# Patient Record
Sex: FEMALE | Race: WHITE | Employment: FULL TIME | URBAN - NONMETROPOLITAN AREA
[De-identification: names, ages, dates, MRNs, and addresses within clinical notes are randomized per-mention and may not be internally consistent; named-entity substitution may affect disease eponyms.]

---

## 2019-12-25 ENCOUNTER — HOSPITAL ENCOUNTER (EMERGENCY)
Facility: HOSPITAL | Age: 49
Discharge: HOME | End: 2019-12-25
Attending: EMERGENCY MEDICINE | Admitting: EMERGENCY MEDICINE
Payer: COMMERCIAL

## 2019-12-25 VITALS
RESPIRATION RATE: 16 BRPM | OXYGEN SATURATION: 96 % | DIASTOLIC BLOOD PRESSURE: 113 MMHG | TEMPERATURE: 96.4 F | SYSTOLIC BLOOD PRESSURE: 154 MMHG | HEART RATE: 97 BPM

## 2019-12-25 DIAGNOSIS — F10.920 ALCOHOLIC INTOXICATION WITHOUT COMPLICATION (H): ICD-10-CM

## 2019-12-25 LAB
ALBUMIN SERPL-MCNC: 4 G/DL (ref 3.4–5)
ALP SERPL-CCNC: 114 U/L (ref 40–150)
ALT SERPL W P-5'-P-CCNC: 24 U/L (ref 0–50)
AMPHETAMINES UR QL: NOT DETECTED NG/ML
ANION GAP SERPL CALCULATED.3IONS-SCNC: 10 MMOL/L (ref 3–14)
APAP SERPL-MCNC: <2 MG/L (ref 10–20)
AST SERPL W P-5'-P-CCNC: 33 U/L (ref 0–45)
BARBITURATES UR QL SCN: NOT DETECTED NG/ML
BASOPHILS # BLD AUTO: 0.1 10E9/L (ref 0–0.2)
BASOPHILS NFR BLD AUTO: 0.6 %
BENZODIAZ UR QL SCN: NOT DETECTED NG/ML
BILIRUB SERPL-MCNC: 0.2 MG/DL (ref 0.2–1.3)
BUN SERPL-MCNC: 6 MG/DL (ref 7–30)
BUPRENORPHINE UR QL: NOT DETECTED NG/ML
CALCIUM SERPL-MCNC: 8.4 MG/DL (ref 8.5–10.1)
CANNABINOIDS UR QL: NOT DETECTED NG/ML
CHLORIDE SERPL-SCNC: 109 MMOL/L (ref 94–109)
CO2 SERPL-SCNC: 22 MMOL/L (ref 20–32)
COCAINE UR QL SCN: NOT DETECTED NG/ML
CREAT SERPL-MCNC: 0.57 MG/DL (ref 0.52–1.04)
D-METHAMPHET UR QL: NOT DETECTED NG/ML
DIFFERENTIAL METHOD BLD: ABNORMAL
EOSINOPHIL # BLD AUTO: 0.4 10E9/L (ref 0–0.7)
EOSINOPHIL NFR BLD AUTO: 2.8 %
ERYTHROCYTE [DISTWIDTH] IN BLOOD BY AUTOMATED COUNT: 13.2 % (ref 10–15)
ETHANOL SERPL-MCNC: 0.12 G/DL
GFR SERPL CREATININE-BSD FRML MDRD: >90 ML/MIN/{1.73_M2}
GLUCOSE SERPL-MCNC: 105 MG/DL (ref 70–99)
HCG UR QL: NEGATIVE
HCT VFR BLD AUTO: 46.2 % (ref 35–47)
HGB BLD-MCNC: 14.7 G/DL (ref 11.7–15.7)
IMM GRANULOCYTES # BLD: 0.1 10E9/L (ref 0–0.4)
IMM GRANULOCYTES NFR BLD: 0.5 %
LYMPHOCYTES # BLD AUTO: 2.1 10E9/L (ref 0.8–5.3)
LYMPHOCYTES NFR BLD AUTO: 16.1 %
MCH RBC QN AUTO: 26.3 PG (ref 26.5–33)
MCHC RBC AUTO-ENTMCNC: 31.8 G/DL (ref 31.5–36.5)
MCV RBC AUTO: 83 FL (ref 78–100)
METHADONE UR QL SCN: NOT DETECTED NG/ML
MONOCYTES # BLD AUTO: 0.8 10E9/L (ref 0–1.3)
MONOCYTES NFR BLD AUTO: 6.4 %
NEUTROPHILS # BLD AUTO: 9.6 10E9/L (ref 1.6–8.3)
NEUTROPHILS NFR BLD AUTO: 73.6 %
NRBC # BLD AUTO: 0 10*3/UL
NRBC BLD AUTO-RTO: 0 /100
OPIATES UR QL SCN: NOT DETECTED NG/ML
OXYCODONE UR QL SCN: NOT DETECTED NG/ML
PCP UR QL SCN: NOT DETECTED NG/ML
PLATELET # BLD AUTO: 423 10E9/L (ref 150–450)
POTASSIUM SERPL-SCNC: 3.5 MMOL/L (ref 3.4–5.3)
PROPOXYPH UR QL: NOT DETECTED NG/ML
PROT SERPL-MCNC: 8 G/DL (ref 6.8–8.8)
RBC # BLD AUTO: 5.58 10E12/L (ref 3.8–5.2)
SALICYLATES SERPL-MCNC: 3 MG/DL
SALICYLATES SERPL-MCNC: 4 MG/DL
SODIUM SERPL-SCNC: 141 MMOL/L (ref 133–144)
TRICYCLICS UR QL SCN: NOT DETECTED NG/ML
WBC # BLD AUTO: 13 10E9/L (ref 4–11)

## 2019-12-25 PROCEDURE — 80329 ANALGESICS NON-OPIOID 1 OR 2: CPT | Mod: 59 | Performed by: EMERGENCY MEDICINE

## 2019-12-25 PROCEDURE — 99283 EMERGENCY DEPT VISIT LOW MDM: CPT | Mod: Z6 | Performed by: EMERGENCY MEDICINE

## 2019-12-25 PROCEDURE — 85025 COMPLETE CBC W/AUTO DIFF WBC: CPT | Performed by: EMERGENCY MEDICINE

## 2019-12-25 PROCEDURE — 36415 COLL VENOUS BLD VENIPUNCTURE: CPT | Performed by: EMERGENCY MEDICINE

## 2019-12-25 PROCEDURE — 80320 DRUG SCREEN QUANTALCOHOLS: CPT | Performed by: EMERGENCY MEDICINE

## 2019-12-25 PROCEDURE — 81025 URINE PREGNANCY TEST: CPT | Performed by: EMERGENCY MEDICINE

## 2019-12-25 PROCEDURE — 80306 DRUG TEST PRSMV INSTRMNT: CPT | Performed by: EMERGENCY MEDICINE

## 2019-12-25 PROCEDURE — 99285 EMERGENCY DEPT VISIT HI MDM: CPT

## 2019-12-25 PROCEDURE — 80053 COMPREHEN METABOLIC PANEL: CPT | Performed by: EMERGENCY MEDICINE

## 2019-12-25 PROCEDURE — 80329 ANALGESICS NON-OPIOID 1 OR 2: CPT | Performed by: EMERGENCY MEDICINE

## 2019-12-25 RX ORDER — AMLODIPINE BESYLATE 2.5 MG/1
2.5 TABLET ORAL DAILY
COMMUNITY

## 2019-12-25 RX ORDER — LOSARTAN POTASSIUM AND HYDROCHLOROTHIAZIDE 12.5; 5 MG/1; MG/1
1 TABLET ORAL DAILY
COMMUNITY

## 2019-12-25 ASSESSMENT — ENCOUNTER SYMPTOMS
CARDIOVASCULAR NEGATIVE: 1
FEVER: 0
GASTROINTESTINAL NEGATIVE: 1
HEMATOLOGIC/LYMPHATIC NEGATIVE: 1
CHILLS: 0
SHORTNESS OF BREATH: 0
RESPIRATORY NEGATIVE: 1
PHOTOPHOBIA: 0
PSYCHIATRIC NEGATIVE: 1
CONSTITUTIONAL NEGATIVE: 1
MYALGIAS: 0
ENDOCRINE NEGATIVE: 1
ALLERGIC/IMMUNOLOGIC NEGATIVE: 1
EYES NEGATIVE: 1
NECK STIFFNESS: 0
MUSCULOSKELETAL NEGATIVE: 1
NEUROLOGICAL NEGATIVE: 1
NECK PAIN: 0
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
## 2019-12-25 NOTE — ED NOTES
2nd ASA level being drawn to see trend. Pt is medically clear pending ASA level and will not have deputy wait for results and will call if ASA level is elevated and will be asked to bring back to an ER. Officer understands discharge directions.

## 2019-12-25 NOTE — ED AVS SNAPSHOT
[facility]  [address] [address]  Phone:  [phone]                                    Felicia Aviles   MRN: [id number]    Department:  [facility]   Date of Visit:  12/25/2019           After Visit Summary Signature Page    I have received my discharge instructions, and my questions have been answered. I have discussed any challenges I see with this plan with the nurse or doctor.    ..........................................................................................................................................  Patient/Patient Representative Signature      ..........................................................................................................................................  Patient Representative Print Name and Relationship to Patient    ..................................................               ................................................  Date                                   Time    ..........................................................................................................................................  Reviewed by Signature/Title    ...................................................              ..............................................  Date                                               Time          22EPIC Rev 08/18

## 2021-01-25 ENCOUNTER — APPOINTMENT (OUTPATIENT)
Dept: PHYSICAL THERAPY | Facility: HOSPITAL | Age: 51
End: 2021-01-25
Attending: INTERNAL MEDICINE
Payer: COMMERCIAL

## 2021-01-25 ENCOUNTER — HOSPITAL ENCOUNTER (OUTPATIENT)
Facility: HOSPITAL | Age: 51
Setting detail: OBSERVATION
Discharge: HOME | End: 2021-01-25
Attending: FAMILY MEDICINE | Admitting: INTERNAL MEDICINE
Payer: COMMERCIAL

## 2021-01-25 ENCOUNTER — APPOINTMENT (OUTPATIENT)
Dept: CT IMAGING | Facility: HOSPITAL | Age: 51
End: 2021-01-25
Attending: FAMILY MEDICINE
Payer: COMMERCIAL

## 2021-01-25 ENCOUNTER — APPOINTMENT (OUTPATIENT)
Dept: MRI IMAGING | Facility: HOSPITAL | Age: 51
End: 2021-01-25
Attending: INTERNAL MEDICINE
Payer: COMMERCIAL

## 2021-01-25 ENCOUNTER — APPOINTMENT (OUTPATIENT)
Dept: OCCUPATIONAL THERAPY | Facility: HOSPITAL | Age: 51
End: 2021-01-25
Attending: INTERNAL MEDICINE
Payer: COMMERCIAL

## 2021-01-25 VITALS
HEIGHT: 67 IN | OXYGEN SATURATION: 98 % | DIASTOLIC BLOOD PRESSURE: 88 MMHG | TEMPERATURE: 98.4 F | RESPIRATION RATE: 16 BRPM | WEIGHT: 209.44 LBS | BODY MASS INDEX: 32.87 KG/M2 | HEART RATE: 83 BPM | SYSTOLIC BLOOD PRESSURE: 150 MMHG

## 2021-01-25 DIAGNOSIS — R53.1 RIGHT SIDED WEAKNESS: Primary | ICD-10-CM

## 2021-01-25 LAB
ALBUMIN SERPL-MCNC: 3.4 G/DL (ref 3.4–5)
ALBUMIN UR-MCNC: NEGATIVE MG/DL
ALP SERPL-CCNC: 112 U/L (ref 40–150)
ALT SERPL W P-5'-P-CCNC: 49 U/L (ref 0–50)
AMPHETAMINES UR QL: NOT DETECTED NG/ML
ANION GAP SERPL CALCULATED.3IONS-SCNC: 5 MMOL/L (ref 3–14)
ANION GAP SERPL CALCULATED.3IONS-SCNC: 8 MMOL/L (ref 3–14)
APPEARANCE UR: CLEAR
APTT PPP: 30 SEC (ref 22–37)
AST SERPL W P-5'-P-CCNC: 71 U/L (ref 0–45)
BACTERIA #/AREA URNS HPF: ABNORMAL /HPF
BARBITURATES UR QL SCN: NOT DETECTED NG/ML
BASOPHILS # BLD AUTO: 0.1 10E9/L (ref 0–0.2)
BASOPHILS NFR BLD AUTO: 1.3 %
BENZODIAZ UR QL SCN: NOT DETECTED NG/ML
BILIRUB SERPL-MCNC: 0.2 MG/DL (ref 0.2–1.3)
BILIRUB UR QL STRIP: NEGATIVE
BUN SERPL-MCNC: 4 MG/DL (ref 7–30)
BUN SERPL-MCNC: 5 MG/DL (ref 7–30)
BUPRENORPHINE UR QL: NOT DETECTED NG/ML
CALCIUM SERPL-MCNC: 8.1 MG/DL (ref 8.5–10.1)
CALCIUM SERPL-MCNC: 8.7 MG/DL (ref 8.5–10.1)
CANNABINOIDS UR QL: NOT DETECTED NG/ML
CHLORIDE SERPL-SCNC: 107 MMOL/L (ref 94–109)
CHLORIDE SERPL-SCNC: 111 MMOL/L (ref 94–109)
CO2 SERPL-SCNC: 24 MMOL/L (ref 20–32)
CO2 SERPL-SCNC: 30 MMOL/L (ref 20–32)
COCAINE UR QL SCN: NOT DETECTED NG/ML
COLOR UR AUTO: ABNORMAL
CREAT SERPL-MCNC: 0.5 MG/DL (ref 0.52–1.04)
CREAT SERPL-MCNC: 0.56 MG/DL (ref 0.52–1.04)
D-METHAMPHET UR QL: NOT DETECTED NG/ML
DIFFERENTIAL METHOD BLD: ABNORMAL
EOSINOPHIL # BLD AUTO: 0.2 10E9/L (ref 0–0.7)
EOSINOPHIL NFR BLD AUTO: 3.3 %
ERYTHROCYTE [DISTWIDTH] IN BLOOD BY AUTOMATED COUNT: 15 % (ref 10–15)
ERYTHROCYTE [DISTWIDTH] IN BLOOD BY AUTOMATED COUNT: 15.1 % (ref 10–15)
ETHANOL SERPL-MCNC: 0.08 G/DL
GFR SERPL CREATININE-BSD FRML MDRD: >90 ML/MIN/{1.73_M2}
GFR SERPL CREATININE-BSD FRML MDRD: >90 ML/MIN/{1.73_M2}
GLUCOSE SERPL-MCNC: 101 MG/DL (ref 70–99)
GLUCOSE SERPL-MCNC: 113 MG/DL (ref 70–99)
GLUCOSE UR STRIP-MCNC: NEGATIVE MG/DL
HCT VFR BLD AUTO: 39.9 % (ref 35–47)
HCT VFR BLD AUTO: 42.4 % (ref 35–47)
HGB BLD-MCNC: 12.2 G/DL (ref 11.7–15.7)
HGB BLD-MCNC: 13.1 G/DL (ref 11.7–15.7)
HGB UR QL STRIP: ABNORMAL
HYALINE CASTS #/AREA URNS LPF: 2 /LPF
IMM GRANULOCYTES # BLD: 0 10E9/L (ref 0–0.4)
IMM GRANULOCYTES NFR BLD: 0.3 %
INR PPP: 1.07 (ref 0.86–1.14)
KETONES UR STRIP-MCNC: NEGATIVE MG/DL
LABORATORY COMMENT REPORT: ABNORMAL
LEUKOCYTE ESTERASE UR QL STRIP: NEGATIVE
LYMPHOCYTES # BLD AUTO: 1.8 10E9/L (ref 0.8–5.3)
LYMPHOCYTES NFR BLD AUTO: 28.4 %
MCH RBC QN AUTO: 24.3 PG (ref 26.5–33)
MCH RBC QN AUTO: 24.6 PG (ref 26.5–33)
MCHC RBC AUTO-ENTMCNC: 30.6 G/DL (ref 31.5–36.5)
MCHC RBC AUTO-ENTMCNC: 30.9 G/DL (ref 31.5–36.5)
MCV RBC AUTO: 79 FL (ref 78–100)
MCV RBC AUTO: 80 FL (ref 78–100)
METHADONE UR QL SCN: NOT DETECTED NG/ML
MONOCYTES # BLD AUTO: 0.5 10E9/L (ref 0–1.3)
MONOCYTES NFR BLD AUTO: 7.6 %
MUCOUS THREADS #/AREA URNS LPF: PRESENT /LPF
NEUTROPHILS # BLD AUTO: 3.7 10E9/L (ref 1.6–8.3)
NEUTROPHILS NFR BLD AUTO: 59.1 %
NITRATE UR QL: NEGATIVE
NRBC # BLD AUTO: 0 10*3/UL
NRBC BLD AUTO-RTO: 0 /100
OPIATES UR QL SCN: NOT DETECTED NG/ML
OXYCODONE UR QL SCN: NOT DETECTED NG/ML
PCP UR QL SCN: NOT DETECTED NG/ML
PH UR STRIP: 6.5 PH (ref 4.7–8)
PLATELET # BLD AUTO: 372 10E9/L (ref 150–450)
PLATELET # BLD AUTO: 443 10E9/L (ref 150–450)
POTASSIUM SERPL-SCNC: 3.8 MMOL/L (ref 3.4–5.3)
POTASSIUM SERPL-SCNC: 4 MMOL/L (ref 3.4–5.3)
PROPOXYPH UR QL: NOT DETECTED NG/ML
PROT SERPL-MCNC: 7.1 G/DL (ref 6.8–8.8)
RBC # BLD AUTO: 5.03 10E12/L (ref 3.8–5.2)
RBC # BLD AUTO: 5.33 10E12/L (ref 3.8–5.2)
RBC #/AREA URNS AUTO: 1 /HPF (ref 0–2)
SARS-COV-2 RNA RESP QL NAA+PROBE: POSITIVE
SODIUM SERPL-SCNC: 142 MMOL/L (ref 133–144)
SODIUM SERPL-SCNC: 143 MMOL/L (ref 133–144)
SOURCE: ABNORMAL
SP GR UR STRIP: 1.03 (ref 1–1.03)
SPECIMEN SOURCE: ABNORMAL
SQUAMOUS #/AREA URNS AUTO: 0 /HPF (ref 0–1)
TRICYCLICS UR QL SCN: NOT DETECTED NG/ML
TROPONIN I SERPL-MCNC: <0.015 UG/L (ref 0–0.04)
UROBILINOGEN UR STRIP-MCNC: NORMAL MG/DL (ref 0–2)
WBC # BLD AUTO: 6.3 10E9/L (ref 4–11)
WBC # BLD AUTO: 9.7 10E9/L (ref 4–11)
WBC #/AREA URNS AUTO: <1 /HPF (ref 0–5)

## 2021-01-25 PROCEDURE — 81001 URINALYSIS AUTO W/SCOPE: CPT | Performed by: INTERNAL MEDICINE

## 2021-01-25 PROCEDURE — 84484 ASSAY OF TROPONIN QUANT: CPT | Performed by: FAMILY MEDICINE

## 2021-01-25 PROCEDURE — 85730 THROMBOPLASTIN TIME PARTIAL: CPT | Performed by: FAMILY MEDICINE

## 2021-01-25 PROCEDURE — 85027 COMPLETE CBC AUTOMATED: CPT | Mod: 59 | Performed by: INTERNAL MEDICINE

## 2021-01-25 PROCEDURE — 85610 PROTHROMBIN TIME: CPT | Performed by: FAMILY MEDICINE

## 2021-01-25 PROCEDURE — 250N000013 HC RX MED GY IP 250 OP 250 PS 637

## 2021-01-25 PROCEDURE — 250N000011 HC RX IP 250 OP 636: Performed by: FAMILY MEDICINE

## 2021-01-25 PROCEDURE — 70551 MRI BRAIN STEM W/O DYE: CPT

## 2021-01-25 PROCEDURE — 82077 ASSAY SPEC XCP UR&BREATH IA: CPT | Performed by: INTERNAL MEDICINE

## 2021-01-25 PROCEDURE — U0003 INFECTIOUS AGENT DETECTION BY NUCLEIC ACID (DNA OR RNA); SEVERE ACUTE RESPIRATORY SYNDROME CORONAVIRUS 2 (SARS-COV-2) (CORONAVIRUS DISEASE [COVID-19]), AMPLIFIED PROBE TECHNIQUE, MAKING USE OF HIGH THROUGHPUT TECHNOLOGIES AS DESCRIBED BY CMS-2020-01-R: HCPCS | Performed by: FAMILY MEDICINE

## 2021-01-25 PROCEDURE — 97165 OT EVAL LOW COMPLEX 30 MIN: CPT | Mod: GO

## 2021-01-25 PROCEDURE — U0005 INFEC AGEN DETEC AMPLI PROBE: HCPCS | Performed by: FAMILY MEDICINE

## 2021-01-25 PROCEDURE — G0378 HOSPITAL OBSERVATION PER HR: HCPCS

## 2021-01-25 PROCEDURE — 97530 THERAPEUTIC ACTIVITIES: CPT | Mod: GP | Performed by: PHYSICAL THERAPIST

## 2021-01-25 PROCEDURE — 70450 CT HEAD/BRAIN W/O DYE: CPT

## 2021-01-25 PROCEDURE — 36415 COLL VENOUS BLD VENIPUNCTURE: CPT | Performed by: FAMILY MEDICINE

## 2021-01-25 PROCEDURE — 80053 COMPREHEN METABOLIC PANEL: CPT | Performed by: FAMILY MEDICINE

## 2021-01-25 PROCEDURE — 97161 PT EVAL LOW COMPLEX 20 MIN: CPT | Mod: GP | Performed by: PHYSICAL THERAPIST

## 2021-01-25 PROCEDURE — 97530 THERAPEUTIC ACTIVITIES: CPT | Mod: GO

## 2021-01-25 PROCEDURE — C9803 HOPD COVID-19 SPEC COLLECT: HCPCS

## 2021-01-25 PROCEDURE — 93010 ELECTROCARDIOGRAM REPORT: CPT | Performed by: INTERNAL MEDICINE

## 2021-01-25 PROCEDURE — 93005 ELECTROCARDIOGRAM TRACING: CPT

## 2021-01-25 PROCEDURE — 250N000013 HC RX MED GY IP 250 OP 250 PS 637: Performed by: INTERNAL MEDICINE

## 2021-01-25 PROCEDURE — 85025 COMPLETE CBC W/AUTO DIFF WBC: CPT | Performed by: FAMILY MEDICINE

## 2021-01-25 PROCEDURE — 99285 EMERGENCY DEPT VISIT HI MDM: CPT | Performed by: FAMILY MEDICINE

## 2021-01-25 PROCEDURE — 70496 CT ANGIOGRAPHY HEAD: CPT

## 2021-01-25 PROCEDURE — 80306 DRUG TEST PRSMV INSTRMNT: CPT | Performed by: INTERNAL MEDICINE

## 2021-01-25 PROCEDURE — 99235 HOSP IP/OBS SAME DATE MOD 70: CPT | Performed by: INTERNAL MEDICINE

## 2021-01-25 PROCEDURE — 250N000013 HC RX MED GY IP 250 OP 250 PS 637: Performed by: FAMILY MEDICINE

## 2021-01-25 PROCEDURE — 36415 COLL VENOUS BLD VENIPUNCTURE: CPT | Performed by: INTERNAL MEDICINE

## 2021-01-25 PROCEDURE — 250N000013 HC RX MED GY IP 250 OP 250 PS 637: Performed by: NURSE PRACTITIONER

## 2021-01-25 PROCEDURE — 80048 BASIC METABOLIC PNL TOTAL CA: CPT | Performed by: INTERNAL MEDICINE

## 2021-01-25 PROCEDURE — 99291 CRITICAL CARE FIRST HOUR: CPT | Mod: 25

## 2021-01-25 RX ORDER — NITROGLYCERIN 0.4 MG/1
0.4 TABLET SUBLINGUAL EVERY 5 MIN PRN
COMMUNITY

## 2021-01-25 RX ORDER — PEDIATRIC MULTIVITAMIN NO.17
1 TABLET,CHEWABLE ORAL DAILY
COMMUNITY

## 2021-01-25 RX ORDER — IBUPROFEN 400 MG/1
400 TABLET, FILM COATED ORAL EVERY 6 HOURS PRN
Status: DISCONTINUED | OUTPATIENT
Start: 2021-01-25 | End: 2021-01-25 | Stop reason: HOSPADM

## 2021-01-25 RX ORDER — HYDRALAZINE HYDROCHLORIDE 20 MG/ML
5 INJECTION INTRAMUSCULAR; INTRAVENOUS EVERY 6 HOURS PRN
Status: DISCONTINUED | OUTPATIENT
Start: 2021-01-25 | End: 2021-01-25 | Stop reason: HOSPADM

## 2021-01-25 RX ORDER — ESTRADIOL 0.5 MG/1
0.5 TABLET ORAL DAILY
Status: ON HOLD | COMMUNITY
End: 2021-01-25

## 2021-01-25 RX ORDER — LANOLIN ALCOHOL/MO/W.PET/CERES
3 CREAM (GRAM) TOPICAL
Status: DISCONTINUED | OUTPATIENT
Start: 2021-01-25 | End: 2021-01-25 | Stop reason: HOSPADM

## 2021-01-25 RX ORDER — HYDROCHLOROTHIAZIDE 12.5 MG/1
CAPSULE ORAL
Status: COMPLETED
Start: 2021-01-25 | End: 2021-01-25

## 2021-01-25 RX ORDER — AMLODIPINE BESYLATE 5 MG/1
5 TABLET ORAL ONCE
Status: COMPLETED | OUTPATIENT
Start: 2021-01-25 | End: 2021-01-25

## 2021-01-25 RX ORDER — ACETAMINOPHEN 325 MG/1
650 TABLET ORAL EVERY 4 HOURS PRN
Status: DISCONTINUED | OUTPATIENT
Start: 2021-01-25 | End: 2021-01-25 | Stop reason: HOSPADM

## 2021-01-25 RX ORDER — METHYLPHENIDATE HYDROCHLORIDE 20 MG/1
CAPSULE, EXTENDED RELEASE ORAL
Status: ON HOLD | COMMUNITY
End: 2021-01-25

## 2021-01-25 RX ORDER — NICOTINE 21 MG/24HR
1 PATCH, TRANSDERMAL 24 HOURS TRANSDERMAL DAILY PRN
Status: DISCONTINUED | OUTPATIENT
Start: 2021-01-25 | End: 2021-01-25 | Stop reason: HOSPADM

## 2021-01-25 RX ORDER — ONDANSETRON 2 MG/ML
4 INJECTION INTRAMUSCULAR; INTRAVENOUS EVERY 6 HOURS PRN
Status: DISCONTINUED | OUTPATIENT
Start: 2021-01-25 | End: 2021-01-25 | Stop reason: HOSPADM

## 2021-01-25 RX ORDER — ACETAMINOPHEN 325 MG/1
650 TABLET ORAL ONCE
Status: COMPLETED | OUTPATIENT
Start: 2021-01-25 | End: 2021-01-25

## 2021-01-25 RX ORDER — IOPAMIDOL 755 MG/ML
50 INJECTION, SOLUTION INTRAVASCULAR ONCE
Status: COMPLETED | OUTPATIENT
Start: 2021-01-25 | End: 2021-01-25

## 2021-01-25 RX ORDER — ONDANSETRON 4 MG/1
4 TABLET, ORALLY DISINTEGRATING ORAL EVERY 6 HOURS PRN
Status: DISCONTINUED | OUTPATIENT
Start: 2021-01-25 | End: 2021-01-25 | Stop reason: HOSPADM

## 2021-01-25 RX ORDER — SUMATRIPTAN 25 MG/1
25 TABLET, FILM COATED ORAL ONCE
Status: COMPLETED | OUTPATIENT
Start: 2021-01-25 | End: 2021-01-25

## 2021-01-25 RX ORDER — ISOSORBIDE DINITRATE 10 MG/1
10 TABLET ORAL 2 TIMES DAILY
COMMUNITY

## 2021-01-25 RX ORDER — AMLODIPINE BESYLATE 2.5 MG/1
2.5 TABLET ORAL AT BEDTIME
Status: DISCONTINUED | OUTPATIENT
Start: 2021-01-25 | End: 2021-01-25 | Stop reason: HOSPADM

## 2021-01-25 RX ORDER — NITROGLYCERIN 0.4 MG/1
0.4 TABLET SUBLINGUAL EVERY 5 MIN PRN
Status: DISCONTINUED | OUTPATIENT
Start: 2021-01-25 | End: 2021-01-25 | Stop reason: HOSPADM

## 2021-01-25 RX ORDER — CITALOPRAM HYDROBROMIDE 20 MG/1
20 TABLET ORAL AT BEDTIME
Status: DISCONTINUED | OUTPATIENT
Start: 2021-01-25 | End: 2021-01-25 | Stop reason: HOSPADM

## 2021-01-25 RX ORDER — AMLODIPINE BESYLATE 5 MG/1
5 TABLET ORAL AT BEDTIME
Status: DISCONTINUED | OUTPATIENT
Start: 2021-01-25 | End: 2021-01-25

## 2021-01-25 RX ORDER — AMOXICILLIN 250 MG
1 CAPSULE ORAL 2 TIMES DAILY PRN
Status: DISCONTINUED | OUTPATIENT
Start: 2021-01-25 | End: 2021-01-25 | Stop reason: HOSPADM

## 2021-01-25 RX ORDER — LOSARTAN POTASSIUM 50 MG/1
TABLET ORAL
Status: COMPLETED
Start: 2021-01-25 | End: 2021-01-25

## 2021-01-25 RX ORDER — AMOXICILLIN 250 MG
2 CAPSULE ORAL 2 TIMES DAILY PRN
Status: DISCONTINUED | OUTPATIENT
Start: 2021-01-25 | End: 2021-01-25 | Stop reason: HOSPADM

## 2021-01-25 RX ORDER — ASPIRIN 81 MG/1
81 TABLET ORAL DAILY
COMMUNITY

## 2021-01-25 RX ADMIN — IOPAMIDOL 50 ML: 755 INJECTION, SOLUTION INTRAVENOUS at 00:10

## 2021-01-25 RX ADMIN — ACETAMINOPHEN 650 MG: 325 TABLET, FILM COATED ORAL at 02:10

## 2021-01-25 RX ADMIN — SUMATRIPTAN SUCCINATE 25 MG: 25 TABLET ORAL at 11:29

## 2021-01-25 RX ADMIN — LOSARTAN POTASSIUM 50 MG: 50 TABLET, FILM COATED ORAL at 02:11

## 2021-01-25 RX ADMIN — HYDROCHLOROTHIAZIDE 12.5 MG: 12.5 CAPSULE ORAL at 02:11

## 2021-01-25 RX ADMIN — NICOTINE 1 PATCH: 14 PATCH, EXTENDED RELEASE TRANSDERMAL at 11:34

## 2021-01-25 RX ADMIN — IBUPROFEN 400 MG: 400 TABLET ORAL at 03:10

## 2021-01-25 RX ADMIN — NICOTINE 1 PATCH: 14 PATCH, EXTENDED RELEASE TRANSDERMAL at 03:12

## 2021-01-25 RX ADMIN — AMLODIPINE BESYLATE 5 MG: 5 TABLET ORAL at 02:11

## 2021-01-25 ASSESSMENT — MIFFLIN-ST. JEOR: SCORE: 1592.63
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
## 2021-01-25 NOTE — ED NOTES
In CT with patient. Patient is able to speak some words, but has trouble getting the words out.  She is able to tell this RN that she is having pain on left side of her head. Patient has good  on left and no  on right.  Patient able to move left lower extremity, but unable to move right lower extremity. Provider updated on this.
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
## 2021-01-25 NOTE — PROVIDER NOTIFICATION
DATE:  1/25/2021   TIME OF RECEIPT FROM LAB:  0850  LAB TEST:  COVID  LAB VALUE:  +  RESULTS GIVEN WITH READ-BACK TO (PROVIDER):  Ange Guaman NP  TIME LAB VALUE REPORTED TO PROVIDER:   0905      Patient placed in special precautions for COVID.  Precautions discontinued upon verification of Recovered COVID (November 2020 @ [facility])
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
## 2021-01-25 NOTE — PLAN OF CARE
Pt A&Ox3, speech is somewhat slow at times. She reports aching and sharp heachache like pain on the left side. Ibuprofen given with no issues swallowing. Pupils are equal and reactive. Face is symmetrical but reports tingling and fullness on the right side of face. Able to stick out tongue but unable to move it back and forth. Right extremities are weak compared to left. Pt stood and was able to take a few steps to transfer to bed. Urine collected and sent. Small bruise on chest and arms. Rash to hands bilaterally. IV saline lock. Pt has meds that was placed in med room drawer. Alarms on, call light within reach and pt calls appropriately.     Face to face report given with opportunity to observe patient.    Report given to NICK Young RN   1/25/2021  7:41 AM
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
## 2021-01-25 NOTE — PLAN OF CARE
[facility] Inpatient Admission Note:    Patient admitted to 3232/3232-1 at approximately 0245 via cart accompanied by other:er staff from emergency room . Report received from ulises in SBAR format at 0217 via telephone. Patient ambulated to bed via self.. Patient is alert and oriented X 3, reports pain; rates at 6 on 0-10 scale.  Patient oriented to room, unit, hourly rounding, and plan of care. Explained admission packet and patient handbook with patient bill of rights brochure. Will continue to monitor and document as needed.     Inpatient Nursing criteria listed below was met:    Health care directives status obtained and documented: Yes    Care Everywhere authorization obtained No    MRSA swab completed for patient 65 years and older: N/A    Patient identifies a surrogate decision maker: Yes If yes, who:spouse simon Contact Information:see facesheet     If initial lactic acid >2.0, repeat lactic acid drawn within one hour of arrival to unit: NA. If no, state reason: NA    Vaccination assessment and education completed: Yes   Vaccinations received prior to admission: Pneumovax no  Influenza(seasonal)  NO   Vaccination(s) ordered: patient declines    Clergy visit ordered if patient requests: N/A    Skin issues/needs documented: N/A    Isolation Patient: no Education given, correct sign in place and documentation row added to PCS:  No    Fall Prevention No: Care plan updated, education given and documented, sticker and magnet in place: N/A    Care Plan initiated: Yes    Education Documented (including assessment): Yes    Patient has discharge needs : No If yes, please explain:NA

## 2021-01-25 NOTE — PLAN OF CARE
Prior to Admission Medication Reconciliation:     Medications added:   [] None  [x] As listed below: asa- discontinued in December by [facility] but pt reports taking PTA    Medications deleted:   [] None  [x] As listed below:    Omeprazole- marked for review/removal by MD. Still on medlist from [facility] but pt reports therapy complete    Changes made to existing medications:   [] None  [x] As listed below:    Amlodipine from 5 mg to 2.5 mg  Per [facility] records and pt confirmation- no rx history available from pharmacies pt listed    Last times/dates taken verified with patient:  [] Yes- completed myself  [x] Nurse completed no changes made  [] Unable to review with patient:  [] Nurse completed/changes made:       Allergy modifications:    [x]Did not review  []Patient verified NKA  []Patient verified current existing allergies: no changes made  []New allergies: listed below    Medication reconciliation sources:   [x]Patient  []Patient family member/emergency contact: **  [x][facility] Report Review:  Home Medications     - Last Reconciled 12/07/20 by Aleksandra An    [x]amlodipine 2.5 mg tablet 2.5 mg PO DAILY   []aspirin 81 mg tablet,delayed release (Adult Low Dose Aspirin) 81 mg PO DAILY (discontinued this day)  [x]citalopram 20 mg tablet (Celexa) 20 mg PO DAILY   []codeine 10 mg-guaifenesin 100 mg/5 mL oral liquid  5 mL PO ONCE PRN   []ergocalciferol (vitamin D2) 1,250 mcg (50,000 unit) capsule 50,000 units PO QWEEK   [x]isosorbide dinitrate 10 mg tablet 10 mg PO BID   [x]losartan 50 mg-hydrochlorothiazide 12.5 mg tablet (Hyzaar) 1 tab PO DAILY   [x]mecobalamin (vitamin B12) 1,000 mcg disintegrating tablet,sublingual 1,000 mcg sublingual DAILY   [x]omeprazole 20 mg capsule,delayed release 20 mg PO DAILY   []hydrocodone-acetaminophen 5-325 mg (Norco) 1 tab PO BID PRN 10 tabs 0RF pain  []Epic Chart Review  []Care Everywhere review  []Pharmacy med list: **  [x]Pharmacy phone call: [facility] and [facility]. The  only maintenance medication that pt has filled in the last year was the isosorbide.   []Outside meds dispense report  []Nursing home or Assisted Living MAR:  []Other: **    Pharmacy desired at discharge: [facility]    Is patient on coumadin?  [x]No      Requests for consultation by provider or pharmacist:   [] Patient understands why all of their meds were prescribed and how to take them. No questions.   [] Fill dates coincide with compliancy for all maintenance meds.   [x] Fill dates do not coincide with compliancy with maintenance meds.  [] Patient has questions about the following:        Comments: very non-compliant pt. Couldn't find fill history for most of her medication. She did report  being non-compliant. Meds not entered from [facility]. Chain's medlist were reported as short term for COVID that she is no longer taking and completed those therapies.       Jaycee Castaneda on 1/25/2021 at 9:14 AM       Discrepancies: [x] No []Not Applicable []Yes: listed below    Time spent on medication reconciliation:   []5-20 mins  [x]20-40 mins  []> 40 mins    Issues completing PTA medication reconciliation:  [] On hold for a long time  [] Waited for a call back  [] Fax didn't come through  [] Fax took a long time  [] Other:    Notifying appropriate party of changes/additions/discrepancies:  []No pertinent changes made, notification not necessary.   [x] Notified attending provider via text page  [] Notified attending provider in person  [] Notified pharmacy  [] Notified nurse  [] Attending provider not available, left detailed notes  [] Changes/additions made don't need provider notification because provider has not seen patient or input any orders  [] Changes/additions made don't need provider notification because changes made are to medications not ordered    Medications Prior to Admission   Medication Sig Dispense Refill Last Dose     amLODIPine (NORVASC) 2.5 MG tablet Take 2.5 mg by mouth daily    1/23/2021 at 2200     aspirin 81  MG EC tablet Take 81 mg by mouth daily   Past Week at Unknown time     citalopram (CELEXA) 20 MG tablet Take 20 mg by mouth daily    1/23/2021 at 2200     cyanocobalamin 1000 MCG SUBL Place 1,000 mcg under the tongue daily   Past Week at Unknown time     isosorbide dinitrate (ISORDIL) 10 MG tablet Take 10 mg by mouth 2 times daily   1/23/2021 at 2200     losartan-hydrochlorothiazide (HYZAAR) 50-12.5 MG tablet Take 1 tablet by mouth daily   1/23/2021 at 2200     nitroGLYcerin (NITROSTAT) 0.4 MG sublingual tablet Place 0.4 mg under the tongue every 5 minutes as needed for chest pain For chest pain place 1 tablet under the tongue every 5 minutes for 3 doses. If symptoms persist 5 minutes after 1st dose call 911.   1/24/2021 at Unknown time     Pediatric Multiple Vitamins (MULTIVITAMIN CHILDRENS) CHEW Take 1 tablet by mouth daily   Past Week at Unknown time     omeprazole (PRILOSEC) 20 MG DR capsule Take 20 mg by mouth daily
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
## 2021-01-25 NOTE — PLAN OF CARE
Patient discharged at 4:02 PM via wheel chair accompanied by spouse and staff. Prescriptions - None ordered for discharge. All belongings sent with patient.     Discharge instructions reviewed with patient. Listed belongings gathered and returned to patient.     Patient discharged to home.   Report called to n/a    Core Measures and Vaccines  Core Measures applicable during stay: No. If yes, state diagnosis:  n/a  Pneumonia and Influenza given prior to discharge, if indicated: No    Surgical Patient   Surgical Procedures during stay: n/a  Did patient receive discharge instruction on wound care and recognition of infection symptoms? N/A    MISC  Follow up appointment made:  Yes  Home and hospital aquired medications returned to patient: Yes  Patient reports pain was well managed at discharge: Yes

## 2021-01-25 NOTE — DISCHARGE INSTRUCTIONS
Referral to [facility] [phone].  They will contact you to schedule an appointment.  If you don't receive a call by Wednesday, January 27th, call [phone].    YOU HAVE A FOLLOW UP APPOINTMENT WITH ALBINA GERONIMO CNP ON Monday, February 1ST AT 3P.M. AT THE [facility]         What to expect when you have contrast    During your exam, we will inject  contrast  into your vein or artery. (Contrast is a clear liquid with iodine in it. It shows up on X-rays.)    You may feel warm or hot. You may have a metal taste in your mouth and a slight upset stomach. You may also feel pressure near the kidneys and bladder. These effects will last about 1 to 3 minutes.    Please tell us if you have:    Sneezing     Itching    Hives     Swelling in the face    A hoarse voice    Breathing problems    Other new symptoms    Serious problems are rare.  They may include:    Irregular heartbeat     Seizures    Kidney failure              Tissue damage    Shock      Death    If you have any problems during the exam, we  will treat them right away.    When you get home    Call your hospital if you have any new symptoms in the next 2 days, like hives or swelling. (Phone numbers are at the bottom of this page.) Or call your family doctor.     If you have wheezing or trouble breathing, call 911.    Self-care  -Drink at least 4 extra glasses of water today.   This reduces the stress on your kidneys.  -Keep taking your regular medicines.    The contrast will pass out of your body in your  Urine(pee). This will happen in the next 24 hours. You  will not feel this. Your urine will not  change color.    If you have kidney problems or take metformin    Drink 4 to 8 large glasses of water for the next  2 days, if you are not on a fluid restriction.    ?If you take metformin (Glucophage or Glucovance) for diabetes, keep taking it.      ?Your kidney function tests are abnormal.  If you take Metformin, do not take it for 48 hours.  Please go to your clinic for a blood test within 3 days after your exam before the restarting this medicine.     (Note to provider:please give patient prescription for lab tests.)    ?Special instructions:     I have read and understand the above information.    Patient Sign Here:______________________________________Date:________Time:______    Staff Sign Here:________________________________________Date:_______Time:______      Radiology Departments:     ?[facility]: [phone] ?[place]: [phone]     ?[place]: [phone] ?[facility]:[phone]      ?[place]: [phone]  ?[place]: [phone]  ?[place]:[phone]    ?[facility] [place]:[phone]  ?[facility] [place]:[phone]
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
## 2021-01-25 NOTE — PROGRESS NOTES
Care Transitions focused note:      Felicia interested in counseling as she wants to get better control of her anger.  Referral faxed to [facility].  Awaiting return call at this time.    Denies additional questions or concerns at this time.

## 2021-01-26 NOTE — PLAN OF CARE
Occupational Therapy Discharge Summary    Reason for therapy discharge:    Discharged to home.    Progress towards therapy goal(s). See goals on Care Plan in [facility] electronic health record for goal details.  Goals not met.  Barriers to achieving goals:   discharge on same date as initial evaluation.    Therapy recommendation(s):    Continued therapy is recommended.  Rationale/Recommendations:  Pt may benefit from OP OT in the future if she continues to exhibit R sided weakness.

## 2022-02-22 ENCOUNTER — MEDICAL CORRESPONDENCE (OUTPATIENT)
Dept: MRI IMAGING | Facility: HOSPITAL | Age: 52
End: 2022-02-22
Payer: COMMERCIAL

## 2022-03-07 ENCOUNTER — HOSPITAL ENCOUNTER (OUTPATIENT)
Dept: MRI IMAGING | Facility: HOSPITAL | Age: 52
Discharge: HOME | End: 2022-03-07
Admitting: RADIOLOGY
Payer: COMMERCIAL

## 2022-03-07 DIAGNOSIS — M25.511 PAIN IN RIGHT SHOULDER: ICD-10-CM

## 2022-03-07 PROCEDURE — 73221 MRI JOINT UPR EXTREM W/O DYE: CPT | Mod: RT
